# Patient Record
(demographics unavailable — no encounter records)

---

## 2025-03-24 NOTE — RISK ASSESSMENT
[Clinical Interview] : Clinical Interview [No] : No [None in the patient's lifetime] : None in the patient's lifetime

## 2025-03-26 NOTE — ADDENDUM
[FreeTextEntry1] : Patient was seen and examined by me, Dr. Nikia Brush. I reviewed and agreed with the findings and plan as documented in the [facility] note, unless noted below.

## 2025-03-26 NOTE — DISCUSSION/SUMMARY
[Low acute suicide risk] : Low acute suicide risk [No] : No [Not clinically indicated] : Safety Plan completed/updated (for individuals at risk): Not clinically indicated [FreeTextEntry1] : At present, patient has a low risk of harm to self.  Although patient has risk factors including male gender and anxiety, patient has significant protective factors including strong family/social support, domiciled, age, lack of prior self-harm, no suicide attempts, no substance use, no geraldine, no psychosis, no CAH, no psychiatric hospitalization, current willingness to engage in treatment, participation in safety planning, future orientation with long & short term goals for the future, hopeful, help-seeking, engaged in school & activities, current denial of any SIIP or urges to self-harm, no reported hx of abuse/trauma, no aggression/violence, no access to guns/family is able to means restrict, no legal history.

## 2025-03-26 NOTE — PHYSICAL EXAM
[Well groomed] : well groomed [Cooperative] : cooperative [Euthymic] : euthymic [Full] : full [Clear] : clear [Linear/Goal Directed] : linear/goal directed [None Reported] : none reported [Average] : average [WNL] : within normal limits [Not applicable] : not applicable [Normal] : normal [None] : none

## 2025-03-26 NOTE — HISTORY OF PRESENT ILLNESS
[FreeTextEntry1] :  Patient is a 10y/o male, domiciled with parents and siblings, currently enrolled at [employer], 6th grade general education placement with [facility] services, not currently in outpatient treatment, no formal PPH, no prior psychiatric hospitalization, no self-injury or suicide attempts, no aggression/violence, no substance use, no legal issues, no CPS involvement, no trauma/abuse, no PMH, presenting today with his mother who was referred by Malgorzata Andino, school psychologist, for heightened anxiety and putting excessive pressure on self.   NP student FB met with patient. Pt is alert & oriented, calm and cooperative, clothing appropriate for stated age and weather, and maintains fair eye contact throughout interview. Reported mood is good, affect congruent. Pt shares that he loves school because he loves to learn, at this time his favorite subject is social studies. He often feels overwhelmed by upcoming exams, expressing fear of failure despite performing well academically, states "I know I can do better". He recalls two months ago when he did not do well in one of his science exams, he did not feel good while taking the test, continues to complete it but had to go to the nursing office. He says that he tries not to let his feelings interfere with his studies, and says he always makes time to study and complete his assignments. The patient has tried deep breathing and relaxation exercise as a coping strategy for when he feels anxious or nervous. He reports feeling anxious and nervous at least once a week, Fridays are usually his exam day but endorses that it does not keep up at night or prevents him from going to school. Outside of school, he engages in sports activities, enjoys soccer, football and basketball. Reports good relationships with his parents and sister. At home, his routine in the morning is to get up and get ready for school but becomes nervous when he wakes up late. For example, he would wake up at 7am and would start panicking because he feels like he needs to do a lot, he knows he needs to leave by 720 and cannot get things done, so now he is waking up at 645. Pt denies any aggression or physical violence when he becomes anxious.  Pt reports good sleep at night and good appetite. Pt is slightly guarded during interview but able to engage in meaningful conversation. No psychomotor activity noted Denies any bullying. Denies any worries or irrational beliefs. Denies any repetitive or ritualistic behaviors. Denies SI/HI. Denies AH/VH. Denies any substance use.   CHUCK  met with patient's mother. Patient's mother is reporting general anxiety symptoms of: persistent worrying or anxiety about a number of areas that are out of proportion to the impact of the events; over-thinking plans and solutions to all possible worst-case outcomes; perceiving situations and events as threatening, even when they aren't; difficulty handling uncertainty; indecisiveness and fear of making the wrong decision; inability to set aside or let go of a worry; inability to relax, feeling restless, and feeling on edge; and difficulty concentrating. Patient's mother reports noticing increase in self-consciousness and linking his academic performance and perception of peers to his self worth. Patient's mother reports initially noticing anxiety 1-2 years ago but reports heightened anxiety since October 2024. Patient's mother endorses healthy sleeping and eating habits for patient. Patient's mother reports history of special educational services for patient, beginning with early intervention (EI) for speech delay. Patient continued to receive speech therapy through [facility], and when patient began  her received speech and OT as well as beginning in self-contained 12:1:1 classroom. Patient's mother reports that patient is now in general education and receives speech and resource room, but wants to discontinue both services. Patient is currently meeting/exceeding grade level expectations. [FreeTextEntry2] : none reported

## 2025-03-26 NOTE — PLAN
[Provision of National Suicide Prevention Lifeline 5-788-588-TALK (1156)] : Provision of national suicide prevention lifeline 5-215-622-talk (3966) [Family] : family [None on Record] : none on record [TextBox_9] : patient to be referred for individual therapy; care coordinator will follow up regarding next steps [TextBox_11] : not currently indicated  [TextBox_13] :  no safety concerns at this time. no guns at home. family should call 911 or go to nearest ER or any acute safety concerns. [TextBox_26] : school hipaa not signed

## 2025-03-26 NOTE — PLAN
[Provision of National Suicide Prevention Lifeline 7-250-673-TALK (1573)] : Provision of national suicide prevention lifeline 5-883-568-talk (9613) [Family] : family [None on Record] : none on record [TextBox_9] : patient to be referred for individual therapy; care coordinator will follow up regarding next steps [TextBox_11] : not currently indicated  [TextBox_13] :  no safety concerns at this time. no guns at home. family should call 911 or go to nearest ER or any acute safety concerns. [TextBox_26] : school hipaa not signed

## 2025-03-26 NOTE — REASON FOR VISIT
[Behavioral Health Urgent Care Assessment] : a behavioral health urgent care assessment [School] : school [Patient] : patient [Mother] : mother [TextBox_17] : heightened anxiety

## 2025-03-26 NOTE — SOCIAL HISTORY
No
[No Known Substance Use] : no known substance use
[No Known Substance Use] : no known substance use

## 2025-03-26 NOTE — HISTORY OF PRESENT ILLNESS
[FreeTextEntry1] :  Patient is a 12y/o male, domiciled with parents and siblings, currently enrolled at [employer], 6th grade general education placement with [facility] services, not currently in outpatient treatment, no formal PPH, no prior psychiatric hospitalization, no self-injury or suicide attempts, no aggression/violence, no substance use, no legal issues, no CPS involvement, no trauma/abuse, no PMH, presenting today with his mother who was referred by Malgorzata Andino, school psychologist, for heightened anxiety and putting excessive pressure on self.   NP student FB met with patient. Pt is alert & oriented, calm and cooperative, clothing appropriate for stated age and weather, and maintains fair eye contact throughout interview. Reported mood is good, affect congruent. Pt shares that he loves school because he loves to learn, at this time his favorite subject is social studies. He often feels overwhelmed by upcoming exams, expressing fear of failure despite performing well academically, states "I know I can do better". He recalls two months ago when he did not do well in one of his science exams, he did not feel good while taking the test, continues to complete it but had to go to the nursing office. He says that he tries not to let his feelings interfere with his studies, and says he always makes time to study and complete his assignments. The patient has tried deep breathing and relaxation exercise as a coping strategy for when he feels anxious or nervous. He reports feeling anxious and nervous at least once a week, Fridays are usually his exam day but endorses that it does not keep up at night or prevents him from going to school. Outside of school, he engages in sports activities, enjoys soccer, football and basketball. Reports good relationships with his parents and sister. At home, his routine in the morning is to get up and get ready for school but becomes nervous when he wakes up late. For example, he would wake up at 7am and would start panicking because he feels like he needs to do a lot, he knows he needs to leave by 720 and cannot get things done, so now he is waking up at 645. Pt denies any aggression or physical violence when he becomes anxious.  Pt reports good sleep at night and good appetite. Pt is slightly guarded during interview but able to engage in meaningful conversation. No psychomotor activity noted Denies any bullying. Denies any worries or irrational beliefs. Denies any repetitive or ritualistic behaviors. Denies SI/HI. Denies AH/VH. Denies any substance use.   CHUCK  met with patient's mother. Patient's mother is reporting general anxiety symptoms of: persistent worrying or anxiety about a number of areas that are out of proportion to the impact of the events; over-thinking plans and solutions to all possible worst-case outcomes; perceiving situations and events as threatening, even when they aren't; difficulty handling uncertainty; indecisiveness and fear of making the wrong decision; inability to set aside or let go of a worry; inability to relax, feeling restless, and feeling on edge; and difficulty concentrating. Patient's mother reports noticing increase in self-consciousness and linking his academic performance and perception of peers to his self worth. Patient's mother reports initially noticing anxiety 1-2 years ago but reports heightened anxiety since October 2024. Patient's mother endorses healthy sleeping and eating habits for patient. Patient's mother reports history of special educational services for patient, beginning with early intervention (EI) for speech delay. Patient continued to receive speech therapy through [facility], and when patient began  her received speech and OT as well as beginning in self-contained 12:1:1 classroom. Patient's mother reports that patient is now in general education and receives speech and resource room, but wants to discontinue both services. Patient is currently meeting/exceeding grade level expectations. [FreeTextEntry2] : none reported